# Patient Record
Sex: FEMALE
[De-identification: names, ages, dates, MRNs, and addresses within clinical notes are randomized per-mention and may not be internally consistent; named-entity substitution may affect disease eponyms.]

---

## 2018-05-05 ENCOUNTER — HOSPITAL ENCOUNTER (EMERGENCY)
Dept: HOSPITAL 42 - ED | Age: 23
Discharge: ANOTHER HOSPITAL | End: 2018-05-05
Payer: MEDICAID

## 2018-05-05 ENCOUNTER — HOSPITAL ENCOUNTER (EMERGENCY)
Dept: HOSPITAL 14 - H.EROB2 | Age: 23
Discharge: HOME | End: 2018-05-05
Payer: MEDICAID

## 2018-05-05 VITALS
RESPIRATION RATE: 17 BRPM | DIASTOLIC BLOOD PRESSURE: 59 MMHG | OXYGEN SATURATION: 100 % | TEMPERATURE: 98.6 F | SYSTOLIC BLOOD PRESSURE: 105 MMHG | HEART RATE: 90 BPM

## 2018-05-05 VITALS — BODY MASS INDEX: 22.4 KG/M2

## 2018-05-05 VITALS — OXYGEN SATURATION: 100 % | RESPIRATION RATE: 18 BRPM

## 2018-05-05 VITALS — TEMPERATURE: 98.2 F

## 2018-05-05 VITALS — DIASTOLIC BLOOD PRESSURE: 59 MMHG | SYSTOLIC BLOOD PRESSURE: 113 MMHG | HEART RATE: 80 BPM

## 2018-05-05 DIAGNOSIS — Z3A.21: ICD-10-CM

## 2018-05-05 DIAGNOSIS — O47.02: Primary | ICD-10-CM

## 2018-05-05 DIAGNOSIS — R10.2: ICD-10-CM

## 2018-05-05 DIAGNOSIS — O26.92: Primary | ICD-10-CM

## 2018-05-05 LAB
ALBUMIN SERPL-MCNC: 3.7 G/DL (ref 3–4.8)
ALBUMIN/GLOB SERPL: 1.1 {RATIO} (ref 1.1–1.8)
ALT SERPL-CCNC: 17 U/L (ref 7–56)
APPEARANCE UR: CLEAR
APTT BLD: 31.9 SECONDS (ref 25.1–36.5)
AST SERPL-CCNC: 17 U/L (ref 14–36)
BASOPHILS # BLD AUTO: 0.01 K/MM3 (ref 0–2)
BASOPHILS NFR BLD: 0.1 % (ref 0–3)
BILIRUB UR-MCNC: NEGATIVE MG/DL
BUN SERPL-MCNC: 6 MG/DL (ref 7–21)
CALCIUM SERPL-MCNC: 8.8 MG/DL (ref 8.4–10.5)
COLOR UR: YELLOW
EOSINOPHIL # BLD: 0 10*3/UL (ref 0–0.7)
EOSINOPHIL NFR BLD: 0.2 % (ref 1.5–5)
ERYTHROCYTE [DISTWIDTH] IN BLOOD BY AUTOMATED COUNT: 14.9 % (ref 11.5–14.5)
GFR NON-AFRICAN AMERICAN: > 60
GLUCOSE UR STRIP-MCNC: NEGATIVE MG/DL
GRANULOCYTES # BLD: 9.69 10*3/UL (ref 1.4–6.5)
GRANULOCYTES NFR BLD: 87.3 % (ref 50–68)
HCG,QUALITATIVE URINE: POSITIVE
HGB BLD-MCNC: 9.5 G/DL (ref 12–16)
INR PPP: 1.01 (ref 0.93–1.08)
LEUKOCYTE ESTERASE UR-ACNC: NEGATIVE LEU/UL
LYMPHOCYTES # BLD: 1 10*3/UL (ref 1.2–3.4)
LYMPHOCYTES NFR BLD AUTO: 9.1 % (ref 22–35)
MCH RBC QN AUTO: 26.7 PG (ref 25–35)
MCHC RBC AUTO-ENTMCNC: 32.4 G/DL (ref 31–37)
MCV RBC AUTO: 82.3 FL (ref 80–105)
MONOCYTES # BLD AUTO: 0.4 10*3/UL (ref 0.1–0.6)
MONOCYTES NFR BLD: 3.3 % (ref 1–6)
PH UR STRIP: 7.5 [PH] (ref 4.7–8)
PLATELET # BLD: 261 10^3/UL (ref 120–450)
PMV BLD AUTO: 10.7 FL (ref 7–11)
PROT UR STRIP-MCNC: NEGATIVE MG/DL
PROTHROMBIN TIME: 11.6 SECONDS (ref 9.4–12.5)
RBC # BLD AUTO: 3.56 10^6/UL (ref 3.5–6.1)
RBC # UR STRIP: NEGATIVE /UL
SP GR UR STRIP: 1.02 (ref 1–1.03)
UROBILINOGEN UR STRIP-ACNC: 0.2 E.U./DL
WBC # BLD AUTO: 11.1 10^3/UL (ref 4.5–11)

## 2018-05-05 PROCEDURE — 84703 CHORIONIC GONADOTROPIN ASSAY: CPT

## 2018-05-05 PROCEDURE — 96361 HYDRATE IV INFUSION ADD-ON: CPT

## 2018-05-05 PROCEDURE — 96360 HYDRATION IV INFUSION INIT: CPT

## 2018-05-05 PROCEDURE — 86850 RBC ANTIBODY SCREEN: CPT

## 2018-05-05 PROCEDURE — 86900 BLOOD TYPING SEROLOGIC ABO: CPT

## 2018-05-05 PROCEDURE — 85025 COMPLETE CBC W/AUTO DIFF WBC: CPT

## 2018-05-05 PROCEDURE — 85730 THROMBOPLASTIN TIME PARTIAL: CPT

## 2018-05-05 PROCEDURE — 99284 EMERGENCY DEPT VISIT MOD MDM: CPT

## 2018-05-05 PROCEDURE — 85610 PROTHROMBIN TIME: CPT

## 2018-05-05 PROCEDURE — 76815 OB US LIMITED FETUS(S): CPT

## 2018-05-05 PROCEDURE — 84702 CHORIONIC GONADOTROPIN TEST: CPT

## 2018-05-05 PROCEDURE — 81003 URINALYSIS AUTO W/O SCOPE: CPT

## 2018-05-05 PROCEDURE — 80053 COMPREHEN METABOLIC PANEL: CPT

## 2018-05-05 NOTE — ED PDOC
Arrival/HPI





- General


Chief Complaint: Abdominal Pain


Time Seen by Provider: 18 10:14


Historian: Patient





- History of Present Illness


Narrative History of Present Illness (Text): 


18 10:28


A 23 year old female  edby lmp 17 of 23 weeks w/pmhx of c-sxns  

who denies any significant past medical, presents to the emergency department 

for contractions to the lower right side of suprapubic region. The patient 

states the pain is exacerbated by movement and exhalation. The patient notes 

that she has associated nausea and vomiting. She denies any dizziness, recent 

symptoms of infective foci, fever, or any other complaints at this time. The 

patient notes that she recently moved to [place] from [place] and she has been 

following up with her family care doctor and is currently on prenatal vitamins 

and has had a ultrasound for this pregnancy. She states that she had no 

difficulties with her previous pregnancies and that was twin gestation.  





LMP: 17 13:08





18 13:10





Time/Duration: Prior to Arrival


Symptom Onset: Sudden


Symptom Course: Unchanged


Quality: Pressure, Cramping


Severity Level: Mild


Activities at Onset: Light


Context: Home





Past Medical History





- Provider Review


Nursing Documentation Reviewed: Yes





- Infectious Disease


Hx of Infectious Diseases: None





- Psychiatric


Hx Substance Use: No





- Surgical History


Hx  Section: Yes (x2)





Family/Social History





- Physician Review


Nursing Documentation Reviewed: Yes


Family/Social History: No Known Family HX


Smoking Status: Never Smoked


Hx Alcohol Use: No


Hx Substance Use: No





Allergies/Home Meds


Allergies/Adverse Reactions: 


Allergies





No Known Allergies Allergy (Verified 18 10:15)


 








Home Medications: 


 Home Meds











 Medication  Instructions  Recorded  Confirmed


 


No Known Home Med  18














Review of Systems





- Physician Review


All systems were reviewed & negative as marked: Yes





- Review of Systems


Constitutional: absent: Fevers


Eyes: Normal


ENT: Normal


Respiratory: Normal


Cardiovascular: Normal


Gastrointestinal: Abdominal Pain (superpubic pqin ), Nausea


Genitourinary Female: Normal


Musculoskeletal: Normal


Skin: Normal


Neurological: absent: Dizziness


Endocrine: Normal


Hemo/Lymphatic: Normal


Psychiatric: Normal





Physical Exam


Vital Signs Reviewed: Yes


Vital Signs











  Temp Pulse Resp BP Pulse Ox


 


 18 12:41   86  18  127/56 L  100


 


 18 10:05  98.0 F  82  18  141/75  100











Temperature: Afebrile


Blood Pressure: Normal


Pulse: Regular


Respiratory Rate: Normal


Appearance: Positive for: Well-Appearing, Non-Toxic, Comfortable


Pain Distress: Mild


Mental Status: Positive for: Alert and Oriented X 3





- Systems Exam


Head: Present: Atraumatic, Normocephalic


Pupils: Present: PERRL


Extroacular Muscles: Present: EOMI


Conjunctiva: Present: Normal


Mouth: Present: Moist Mucous Membranes


Neck: Present: Normal Range of Motion


Respiratory/Chest: Present: Clear to Auscultation, Good Air Exchange.  No: 

Respiratory Distress, Accessory Muscle Use


Cardiovascular: Present: Regular Rate and Rhythm, Normal S1, S2.  No: Murmurs


Abdomen: Present: Other (23 week gravid size uterus ).  No: Tenderness, 

Distention, Peritoneal Signs


Back: Present: Normal Inspection


Upper Extremity: Present: Normal Inspection.  No: Cyanosis, Edema


Lower Extremity: Present: Normal Inspection.  No: Edema


Neurological: Present: GCS=15, CN II-XII Intact, Speech Normal, Motor Func 

Grossly Intact, Normal Sensory Function, Normal Cerebellar Funct, Norm Deep 

Tendon Reflexes, Gait Normal, Memory Normal


Skin: Present: Warm, Dry, Normal Color.  No: Rashes


Psychiatric: Present: Alert, Oriented x 3, Normal Insight, Normal Concentration





Medical Decision Making


ED Course and Treatment: 





18 10:37


Impression: A 23 year old female 23 weeks gravid with superpubic cramping. 





Differential Diagnosis included but are not limited to:  





Plan:





-- Reassess and disposition





Progress Notes:


 


18 13:13


conversation with Dr. Acosta ob/gyn specialist on call agreed to transfer to 

L & D service at [facility] to permit tocodynanometry and further evaluation. 





- Lab Interpretations


Lab Results: 








 18 10:50 





 18 10:50 





 Lab Results





18 11:19: Blood Type A POSITIVE, Antibody Screen Negative, BBK History 

Checked No verified bt


18 10:50: Urine Color Yellow, Urine Appearance Clear, Urine pH 7.5, Ur 

Specific Gravity 1.020, Urine Protein Negative, Urine Glucose (UA) Negative, 

Urine Ketones Negative, Urine Blood Negative, Urine Nitrate Negative, Urine 

Bilirubin Negative, Urine Urobilinogen 0.2, Ur Leukocyte Esterase Negative, 

Urine HCG, Qual Positive


18 10:50: Beta HCG, Quant 47408.00 H


18 10:50: Sodium 139, Potassium 3.7, Chloride 107, Carbon Dioxide 22, 

Anion Gap 13, BUN 6 L, Creatinine 0.5 L, Est GFR ( Amer) > 60, Est GFR (

Non-Af Amer) > 60, Random Glucose 77, Calcium 8.8, Total Bilirubin 0.1 L, AST 17

, ALT 17, Alkaline Phosphatase 56, Total Protein 7.2, Albumin 3.7, Globulin 3.4

, Albumin/Globulin Ratio 1.1


18 10:50: PT 11.6, INR 1.01, APTT 31.9


18 10:50: WBC 11.1 H, RBC 3.56, Hgb 9.5 L, Hct 29.3 L, MCV 82.3, MCH 26.7

, MCHC 32.4, RDW 14.9 H, Plt Count 261, MPV 10.7, Gran % 87.3 H, Lymph % (Auto) 

9.1 L, Mono % (Auto) 3.3, Eos % (Auto) 0.2 L, Baso % (Auto) 0.1, Gran # 9.69 H, 

Lymph # (Auto) 1.0 L, Mono # (Auto) 0.4, Eos # (Auto) 0.0, Baso # (Auto) 0.01











- RAD Interpretation


Radiology Orders: 








18 10:39


FETAL AGE [US] Stat 














- Medication Orders


Current Medication Orders: 











Discontinued Medications





Acetaminophen (Tylenol 325mg Tab)  650 mg PO STAT STA


   Stop: 18 10:24


   Last Admin: 18 10:32  Dose: 650 mg





MAR Pain/Vitals


 Document     18 10:32  [facility]  (Rec: 18 10:33  LMC  [id number])


     Pain Reassessment


      Is This A Pain ReAssessment?               No


     Sleep


      Is patient sleeping during reassessment?   No


     Presence of Pain


      Presence of Pain                           Yes


     Pain Scale Used


      Pain Scale Used                            Numeric


     Location


      Left, Right or Bilateral                   Bilateral


      Upper or Lower                             Lower


      Pain Location Body Site                    Abdomen


      Intensity                                  5





Sodium Chloride (Sodium Chloride 0.9%)  2,000 mls @ 1,000 mls/hr IV .Q2H STA


   Stop: 18 12:14


   Last Admin: 18 10:33  Dose: 1,000 mls/hr





eMAR Start Stop


 Document     18 10:33  L[facility]  (Rec: 18 10:34  LMC  [id number])


     Intravenous Solution


      Start Date                                 18


      Start Time                                 10:34


      End Date                                   18


      End time                                   12:35


      Total Infusion Time                        121














- Scribe Statement


The provider has reviewed the documentation as recorded by the Fran Winchester





Provider Scribe Attestation:


All medical record entries made by the Scribe were at my direction and 

personally dictated by me. I have reviewed the chart and agree that the record 

accurately reflects my personal performance of the history, physical exam, 

medical decision making, and the department course for this patient. I have 

also personally directed, reviewed, and agree with the discharge instructions 

and disposition.








Disposition/Present on Arrival





- Present on Arrival


Any Indicators Present on Arrival: No


History of DVT/PE: No


History of Uncontrolled Diabetes: No


Urinary Catheter: No


History of Decub. Ulcer: No


History Surgical Site Infection Following: None





- Disposition


Have Diagnosis and Disposition been Completed?: Yes


Diagnosis: 


 [place] H[place]'s contraction





Disposition: Trans to Other Acute Care Hosp


Disposition Time: 13:16


Patient Plan: Transfer To


Patient Problems: 


 Current Active Problems











Problem Status Onset


 


[place] H[place]'s contraction Acute  











Condition: IMPROVED


Referrals: 


PCP,NO [Primary Care Provider] - Follow up with primary


Forms:  [place] (English)

## 2018-05-05 NOTE — US
PROCEDURE:  Ultrasound fetal age



HISTORY:

abdominal pain right-side 



COMPARISON:





TECHNIQUE:





FINDINGS:

A single viable intrauterine pregnancy is seen. The fetal heart rate 

is 140. There is fetal motion.  There is a cephalic present a shin.  

The placenta is posterior and free of the os.



BPD equals 21 weeks 3 days



Abdominal circumference equals 21 weeks 6 days



Head circumference equals 21 weeks 3 days



Femur length equals 21 weeks 4 days.



Ultrasound age equals 21 weeks 4 days. Estimated date of delivery is 

09/11/2018.



This is a limited study without a complete anatomic survey.



IMPRESSION:

Single viable intrauterine pregnancy with a gestational age of 21 

weeks 4 days.

## 2018-05-06 NOTE — OBHP
===========================

Datetime: 2018 16:39

===========================

   

IP Adm Impression:  , intrauterine pregnancy

IP Admit Plan:  Observation/Evaluation; Discharge home

Admit Comment, IP Provider:  22 y/o female  with IUP 21.4 wks transferred from [facility], presented to ED with complaints ofr RLQ pain that started this morning. States she woke up and sta
rted feeling pain in her abdomen that she describes as 5/10 in intensity, sharp, radiating along ingu
inal line, and exacerbated with movement. Denies n/v/d, dysuria, fever, vb, or lof. Reports + FM

      

   PNC: No prenatal care. Has had only one visit for diagnosis of pregnancy but never followed due to
 change in location and insurance.

   OBHx: 2 prior C sections- 2014: single birth, FT, no complications. 2016- twin birth, FT, no compl
ications. No abortions

   PMHx: denies

   Medications: PNV

   Allergies: KNDA

   Social: Denies habits x3

   VSS, afebrile

   Physical Exam:

   General: Pt seen lyign in bed comfortbale, NAD

   Cardiac: RRR, no murmurs

   Resp: CTABL

   Abdomen: Soft, mild tenderness to palpation in RLQ, no CVA tenderness, normal bowel sounds, no gau
rding or rigidity

   Bimanual: cervic long and closed

   FHR: 140, reactive

   [place]: uterine irritability

      

   Labs: CBC, CMP, U/A wnl

   OB Ultrasound: Single viable IUP, , Placenta Posterior

   Assessment: IUP 21.4 wks with abdominal pain. Likely round ligament pain.

   Plan: Discharge home. Strongly advised pt to seek proper prenatal care. Contact information to cli
[facility] given. ER precautions provided. 

   Discussed case with Dr. Mason Mendoza, PGY1

EGA AdmitDate IP:  21.4

Vital Signs Provider:  Reviewed; Within Normal Limits

IP Chief Complaint:  Other

Dilatation, Provider:  0

Effacement, Provider:  0

Station, Provider:  -4